# Patient Record
Sex: FEMALE | URBAN - METROPOLITAN AREA
[De-identification: names, ages, dates, MRNs, and addresses within clinical notes are randomized per-mention and may not be internally consistent; named-entity substitution may affect disease eponyms.]

---

## 2021-07-19 ENCOUNTER — OFFICE VISIT - RIVER FALLS (OUTPATIENT)
Dept: FAMILY MEDICINE | Facility: CLINIC | Age: 46
End: 2021-07-19

## 2021-07-19 ASSESSMENT — MIFFLIN-ST. JEOR: SCORE: 1295.37

## 2022-02-12 VITALS
TEMPERATURE: 97.3 F | HEIGHT: 65 IN | DIASTOLIC BLOOD PRESSURE: 74 MMHG | WEIGHT: 143.6 LBS | BODY MASS INDEX: 23.93 KG/M2 | HEART RATE: 68 BPM | SYSTOLIC BLOOD PRESSURE: 125 MMHG

## 2022-02-16 NOTE — NURSING NOTE
Comprehensive Intake Entered On:  7/19/2021 8:27 AM CDT    Performed On:  7/19/2021 8:26 AM CDT by Jez Garcia               Summary   Chief Complaint :   Pre employement px.    Weight Measured :   143.6 lb(Converted to: 143 lb 10 oz, 65.136 kg)    Height Measured - Metric :   164.8 cm(Converted to: 5 ft 5 in, 5.41 ft, 1.65 m)    Systolic Blood Pressure :   125 mmHg   Diastolic Blood Pressure :   74 mmHg   Mean Arterial Pressure :   91 mmHg   Peripheral Pulse Rate :   68 bpm   BP Site :   Right arm   BP Method :   Manual   HR Method :   Electronic   Temperature Tympanic :   97.3 DegF(Converted to: 36.3 DegC)  (LOW)    Jez Garcia - 7/19/2021 8:26 AM CDT   Health Status   Allergies Verified? :   Yes   Medication History Verified? :   Yes   Medical History Verified? :   Yes   Jez Garcia - 7/19/2021 8:26 AM CDT   Consents   Consent for Immunization Exchange :   Consent Granted   Consent for Immunizations to Providers :   Consent Granted   Jez aGrcia - 7/19/2021 8:26 AM CDT   Meds / Allergies   (As Of: 7/19/2021 8:27:38 AM CDT)   Allergies (Active)   No known allergies  Estimated Onset Date:   Unspecified ; Created By:   Jez Garcia; Reaction Status:   Active ; Category:   Drug ; Substance:   No known allergies ; Type:   Allergy ; Updated By:   Jez Garcia; Reviewed Date:   7/19/2021 8:27 AM CDT        Medication List   (As Of: 7/19/2021 8:27:38 AM CDT)        Social History   Social History   (As Of: 7/19/2021 8:27:38 AM CDT)   Tobacco:        Never (less than 100 in lifetime)   (Last Updated: 7/19/2021 8:27:16 AM CDT by Jez Garcia)          Electronic Cigarette/Vaping:        Electronic Cigarette Use: Never.   (Last Updated: 7/19/2021 8:27:21 AM CDT by Jez Garcia)

## 2022-02-16 NOTE — PROGRESS NOTES
Patient:   LUIS A RAOMS            MRN: [id number]            FIN: [id number]               Age:   45 years     Sex:  Female     :  1975   Associated Diagnoses:   Pre-employment examination   Author:   Amador MCKEON, Sarmad VIERA      Subjective   Here for pre-employment exam for  at [employer]  no concerns today  no risk factors for TB      Health Status   Allergies:    Allergic Reactions (Selected)  No known allergies   Medications:  (Selected)   , not on any regular medications   Problem list:    No problem items selected or recorded.      Chief Complaint   2021 8:26 AM CDT    Pre employement px.      Histories   Past Medical History:    No active or resolved past medical history items have been selected or recorded.   Family History:    No family history items have been selected or recorded.   Procedure history:    No procedure history items have been selected or recorded.   Social History:        Electronic Cigarette/Vaping Assessment            Electronic Cigarette Use: Never.      Tobacco Assessment            Never (less than 100 in lifetime)        Objective       Vital Signs   2021 8:26 AM CDT Temperature Tympanic 97.3 DegF  LOW    Peripheral Pulse Rate 68 bpm    HR Method Electronic    Systolic Blood Pressure 125 mmHg    Diastolic Blood Pressure 74 mmHg    Mean Arterial Pressure 91 mmHg    BP Site Right arm    BP Method Manual      Measurements from flowsheet : Measurements   2021 8:26 AM CDT Height Measured - Metric 164.8 cm    Weight Measured - Standard 143.6 lb      General:  No acute distress.    Eye:  Pupils are equal, round and reactive to light, Extraocular movements are intact.    HENT:  Tympanic membranes are clear, No pharyngeal erythema, No sinus tenderness.    Neck:  Supple, Non-tender, No lymphadenopathy.    Respiratory:  Lungs are clear to auscultation.    Cardiovascular:  Normal rate, Regular rhythm, No murmur.    Gastrointestinal:  Soft, Non-tender,  Non-distended, Normal bowel sounds, No organomegaly.    Musculoskeletal:  Normal range of motion.    Neurologic:  Cranial Nerves II-XII are grossly intact, Normal deep tendon reflexes.    Psychiatric:  Appropriate mood & affect.       Results Review   General results   Today's results   7/19/2021 8:26 AM CDT Height Measured - Metric 164.8 cm    Weight Measured - Standard 143.6 lb    Temperature Tympanic 97.3 DegF  LOW    Peripheral Pulse Rate 68 bpm    HR Method Electronic    Systolic Blood Pressure 125 mmHg    Diastolic Blood Pressure 74 mmHg    Mean Arterial Pressure 91 mmHg    BP Site Right arm    BP Method Manual    Allergies Verified? Yes    Medication History Verified? Yes    Medical History Verified? Yes    Chief Complaint Pre employement px.    Consent for Immunization Exchange Consent Granted    Consent for Immunizations to Providers Consent Granted    Comprehensive Intake Form Comprehensive Intake Form    Intake Form Comprehensive Intake   7/19/2021 8:10 AM CDT General Diagnostic Order [facility] / PRE EMPLOYMENT PHYSICAL AND TB QUESTION[facility]   7/19/2021 8:09 AM CDT Release of Information [facility] HE[facility] / PRE EMPLOYMENT PHYSICAL AND TB QUESTIONNAIRE / [facility]   7/19/2021 8:08 AM CDT Consent for Clinical Info Exchange Consent Granted    Consents for Information Exchange Form Consents for Information Exchange Form    Consent Forms Consents for Information Exchange         Plan   Impression and Plan:     Diagnosis     Pre-employment examination ([id number] Z02.1).     .    Condition Form completed and signed.  Approved without restrictions   , and no risk factors for TB.    Orders     Orders   Charges (Evaluation and Management):  81088 initial preventive medicine new patient 40-64yrs (Charge) (Order): Quantity: 1, Pre-employment examination.     .